# Patient Record
Sex: FEMALE | Race: WHITE | NOT HISPANIC OR LATINO | URBAN - METROPOLITAN AREA
[De-identification: names, ages, dates, MRNs, and addresses within clinical notes are randomized per-mention and may not be internally consistent; named-entity substitution may affect disease eponyms.]

---

## 2020-09-20 ENCOUNTER — EMERGENCY (EMERGENCY)
Facility: HOSPITAL | Age: 45
LOS: 0 days | Discharge: HOME | End: 2020-09-20
Attending: EMERGENCY MEDICINE | Admitting: EMERGENCY MEDICINE
Payer: COMMERCIAL

## 2020-09-20 VITALS
OXYGEN SATURATION: 98 % | HEART RATE: 73 BPM | DIASTOLIC BLOOD PRESSURE: 85 MMHG | WEIGHT: 175.05 LBS | SYSTOLIC BLOOD PRESSURE: 144 MMHG | RESPIRATION RATE: 18 BRPM | TEMPERATURE: 97 F

## 2020-09-20 DIAGNOSIS — R21 RASH AND OTHER NONSPECIFIC SKIN ERUPTION: ICD-10-CM

## 2020-09-20 DIAGNOSIS — H61.002 UNSPECIFIED PERICHONDRITIS OF LEFT EXTERNAL EAR: ICD-10-CM

## 2020-09-20 DIAGNOSIS — H92.09 OTALGIA, UNSPECIFIED EAR: ICD-10-CM

## 2020-09-20 DIAGNOSIS — H92.02 OTALGIA, LEFT EAR: ICD-10-CM

## 2020-09-20 PROCEDURE — 99283 EMERGENCY DEPT VISIT LOW MDM: CPT

## 2020-09-20 RX ORDER — CIPROFLOXACIN LACTATE 400MG/40ML
1 VIAL (ML) INTRAVENOUS
Qty: 20 | Refills: 0
Start: 2020-09-20 | End: 2020-09-29

## 2020-09-20 NOTE — ED PROVIDER NOTE - CARE PROVIDER_API CALL
Dulce Kebede  OTOLARYNGOLOGY  [address] [place] [address], [address] [place] [address]  Phone: [phone]  Fax: [phone]  Follow Up Time:

## 2020-09-20 NOTE — ED PROVIDER NOTE - CLINICAL SUMMARY MEDICAL DECISION MAKING FREE TEXT BOX
46yo F with left ear pain. Exam c/w perichondritis. Small pustules in region around left ear likely related to this infection. No fever, no mastoid tenderness, not septic. Patient taking clinda though not as prescribed. Advised to continue clinda as 600mg bid and in addition will add cipro. Recommended close ENT follow up this week. Strict return precautions given.

## 2020-09-20 NOTE — ED PROVIDER NOTE - NS ED ROS FT
Constitutional: No fever, chills.  Eyes:  No visual changes  ENMT:  +left ear pain  Cardiac:  No chest pain  Respiratory:  No cough, SOB  GI:  No nausea, vomiting, diarrhea, abdominal pain.  Neuro:  No headache or lightheadedness  Skin: +rash  Endocrine: No history of thyroid disease or diabetes.  Except as documented in the HPI,  all other systems are negative.

## 2020-09-20 NOTE — ED PROVIDER NOTE - OBJECTIVE STATEMENT
44yo F with no sig PMHx presents for left ear pain for 5 days. 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 Later that day felt like she had a rash on skin anterior to ear and later spread to pinna. Pt initially thought it was related to makeup and used benadryl cream. Skin became more red, painful. Also noticed a painful swollen gland on left neck. 4 days ago went to [facility], she was started on clinda 600mg tid but has accidentally been taking it bid instead. Yesterday noticed rash spreading along mandible and occipital scalp. No drainage. Denies fever, hearing changes, eye pain or redness, neck pain or stiffness, headache, sore throat.

## 2020-09-20 NOTE — ED PROVIDER NOTE - PATIENT PORTAL LINK FT
You can access the FollowMyHealth Patient Portal offered by [facility] by registering at the following website: [email or web address]. By joining [facility]’s FollowMyHealth portal, you will also be able to view your health information using other applications (apps) compatible with our system.

## 2020-09-20 NOTE — ED ADULT NURSE NOTE - NSIMPLEMENTINTERV_GEN_ALL_ED
63
Implemented All Universal Safety Interventions:  [place] to call system. Call bell, personal items and telephone within reach. Instruct patient to call for assistance. Room bathroom lighting operational. Non-slip footwear when patient is off stretcher. Physically safe environment: no spills, clutter or unnecessary equipment. Stretcher in lowest position, wheels locked, appropriate side rails in place.

## 2020-09-20 NOTE — ED PROVIDER NOTE - PHYSICAL EXAMINATION
Vital signs reviewed  GENERAL: Patient nontoxic appearing, NAD  EYES: Conjunctival clear  ENT: Left ear erythema and mild swelling involving approx 70% of the pinna, sparing superior portion, skin appears dry/ slightly crusting, no fluctuance or drainage, no erythema of external ear canal. TMs normal. No mastoid tenderness.   NECK: Supple. 1.5cm tender cervical LN on left neck.   RESPIRATORY: Normal respiratory effort. No stridor  CARDIOVASCULAR: Regular rate and rhythm  SKIN:  Collection of three 0.5mm pustules on left angle of mandible, and 2 smaller pustules posterior to left ear towards occipital scalp, with mild surrounding erythema, no fluctuance, no induration

## 2020-09-20 NOTE — ED PROVIDER NOTE - NSFOLLOWUPINSTRUCTIONS_ED_ALL_ED_FT
Follow up in 2-3 days with ENT.     Perichondritis, Adult       Perichondritis is an infection of the outer ear (pinna) that is caused by bacteria. The pinna is shaped to direct sound into the inner ear. It gets its shape from a firm, elastic tissue (cartilage). The cartilage is protected by another tissue called the perichondrium, which is just under the skin of the pinna.    If you injure your ear, bacteria can get under the skin and infect the perichondrium. The infection can cause swelling and pain. A collection of pus (abscess) may form between the perichondrium and the cartilage. This separates the perichondrium and its blood supply from the cartilage of the ear. A severe or untreated infection can lead to a type of ear deformity called cauliflower ear.      What are the causes?  This condition is caused by bacteria that enters the perichondrium when the outer ear is injured. The injury often results from an upper ear piercing that goes through cartilage instead of the ear lobe. Other causes include:  •Injury to the outer ear (trauma).      •Using needles in or on the ear to treat problems (acupuncture).      •Insect bite.      •Ear pimple or boil.      •Ear surgery.      •Ear burn.        What increases the risk?  You are more likely to develop this condition if:  •You have diabetes.      •You have a weak body defense system (immune system).        What are the signs or symptoms?  Symptoms of this condition include:  •Dull pain that gets worse.      •Increasing ear swelling.      •Warm, tender, red skin over the ear.      •Fever.      •Swollen lymph nodes near the ear.      •Pus draining from the ear.        How is this diagnosed?  This condition may be diagnosed based on:  •Your signs and symptoms, especially if you had a recent ear injury.       •A physical exam. The health care provider will check the ear for swelling, redness, or drainage.      •Lab tests. These may include testing a sample of pus to identify the type of bacteria that is causing the infection (culture).        How is this treated?    This condition is usually treated with antibiotic medicine. You will be given antibiotics that work well against most bacteria that cause the condition. If your infection is severe, you may be given antibiotics through an IV in the hospital. Your antibiotics will be changed if culture results show that your symptoms are caused by a specific kind of bacteria.  Other treatments for this condition may include:  •Using warm or cold compresses to help with pain and discomfort.      •Taking pain medicine.      •Removing pus and dead cartilage with a surgical knife (incision and drainage) or a needle (aspiration).      •Injecting antibiotics and anti-inflammatory medicine (corticosteroids) into the affected area.        Follow these instructions at home:    Medicines     •Take over-the-counter and prescription medicines only as told by your health care provider.       •Take your antibiotic medicine as told by your health care provider. Do not stop taking the antibiotic even if you start to feel better.      Managing pain and swelling   •If directed, put ice on the affected area.  •Put ice in a plastic bag.      •Place a towel between your skin and the bag.      •Leave the ice on for 20 minutes, 2–3 times a day.      •If directed, apply heat to the affected area. Use the heat source that your health care provider recommends, such as a moist heat pack or a heating pad.  •Place a towel between your skin and the heat source.       •Leave the heat on for 20–30 minutes.       •Remove the heat if your skin turns bright red. This is especially important if you are unable to feel pain, heat, or cold. You may have a greater risk of getting burned.        General instructions   •If you had incision and drainage done, follow instructions from your health care provider about how to take care of your incision. Make sure you:   •Wash your hands with soap and water before and after you change your bandage (dressing). If soap and water are not available, use hand .       •Change your dressing as told by your health care provider.       •Check your incision area every day for more redness, swelling, pain, warmth, pus, or a bad smell.      •Do not take baths, swim, or use a hot tub until your health care provider approves.         •Clean the ear as told by your health care provider.      •Resume your usual activities as told by your health care provider. Ask your health care provider what activities are safe for you.       •Keep all follow-up visits as told by your health care provider. This is important.        Contact a health care provider if you have:    •New or increasing chills or fever.      •Any signs of infection that get worse or do not get better. These may include more redness or drainage.      •A severe headache.        Get help right away if you:    •Develop a sudden increase in symptoms such as pain, fever, and swelling.        Summary    •Perichondritis is a bacterial infection of your outer ear. If you injure your ear, bacteria can get under the skin and infect the perichondrium.      •The main symptoms of this condition are pain, swelling, and tender, red skin over the ear.      •This condition can be diagnosed by your signs and symptoms, especially after an ear injury.      •Perichondritis is usually treated with antibiotic medicine. Other treatments include removing pus and dead cartilage with a surgical knife (incision and drainage) or a needle (aspiration).      •Follow instructions about taking medicines, managing pain and swelling, and taking care of your incision if you had incision and drainage done.      This information is not intended to replace advice given to you by your health care provider. Make sure you discuss any questions you have with your health care provider.

## 2020-09-21 PROBLEM — Z00.00 ENCOUNTER FOR PREVENTIVE HEALTH EXAMINATION: Status: ACTIVE | Noted: 2020-09-21

## 2020-09-22 ENCOUNTER — APPOINTMENT (OUTPATIENT)
Dept: OTOLARYNGOLOGY | Facility: CLINIC | Age: 45
End: 2020-09-22
Payer: COMMERCIAL

## 2020-09-22 PROCEDURE — 99205 OFFICE O/P NEW HI 60 MIN: CPT

## 2020-09-22 RX ORDER — PREDNISONE 10 MG/1
10 TABLET ORAL
Qty: 50 | Refills: 0 | Status: ACTIVE | COMMUNITY
Start: 2020-09-22 | End: 1900-01-01

## 2020-09-22 NOTE — PHYSICAL EXAM
[de-identified] : left angie-auricular vesicles [Midline] : trachea located in midline position [Normal] : no rashes

## 2020-09-22 NOTE — HISTORY OF PRESENT ILLNESS
[de-identified] : 45 y.o. female seen in ED on 09/20/20 and diagnosed with a left ear infection. she was given clindamycin initially. her swelling got worse 3 days later. She then came to [facility]. she was given cipro in addition to her clindamycin. \par The next day her PCP thought it was shingles. \par \par pain and crusting is getting better. No change in hearing. No facial paralysis.

## 2020-09-22 NOTE — ASSESSMENT
[FreeTextEntry1] : Patient to start Valtrex given by her PCP. \par Patient advised to start steroids immediately if her face becomes paralyzed. \par \par RTC in 4days.

## 2020-09-28 ENCOUNTER — APPOINTMENT (OUTPATIENT)
Dept: OTOLARYNGOLOGY | Facility: CLINIC | Age: 45
End: 2020-09-28
Payer: COMMERCIAL

## 2020-09-28 PROCEDURE — 99213 OFFICE O/P EST LOW 20 MIN: CPT

## 2020-09-28 RX ORDER — CLINDAMYCIN HYDROCHLORIDE 300 MG/1
300 CAPSULE ORAL
Qty: 21 | Refills: 0 | Status: ACTIVE | COMMUNITY
Start: 2020-06-04

## 2020-09-28 RX ORDER — AZELASTINE HYDROCHLORIDE 0.5 MG/ML
0.05 SOLUTION/ DROPS OPHTHALMIC
Qty: 6 | Refills: 0 | Status: ACTIVE | COMMUNITY
Start: 2020-08-19

## 2020-09-28 RX ORDER — HYDROCORTISONE 25 MG/G
2.5 CREAM TOPICAL
Qty: 28 | Refills: 0 | Status: ACTIVE | COMMUNITY
Start: 2020-05-28

## 2020-09-28 RX ORDER — CETIRIZINE HYDROCHLORIDE 10 MG/1
10 TABLET, COATED ORAL
Qty: 30 | Refills: 0 | Status: ACTIVE | COMMUNITY
Start: 2020-08-19

## 2020-09-28 RX ORDER — DESLORATADINE 5 MG/1
5 TABLET, ORALLY DISINTEGRATING ORAL
Qty: 90 | Refills: 0 | Status: ACTIVE | COMMUNITY
Start: 2020-07-27

## 2020-09-28 RX ORDER — CIPROFLOXACIN HYDROCHLORIDE 500 MG/1
500 TABLET, FILM COATED ORAL
Qty: 20 | Refills: 0 | Status: ACTIVE | COMMUNITY
Start: 2020-09-20

## 2020-09-28 RX ORDER — BUSPIRONE HYDROCHLORIDE 10 MG/1
10 TABLET ORAL
Qty: 270 | Refills: 0 | Status: ACTIVE | COMMUNITY
Start: 2020-04-28

## 2020-09-28 NOTE — HISTORY OF PRESENT ILLNESS
[de-identified] : 45 y.o. female seen in ED on 09/20/20 and diagnosed with a left ear infection. she was given clindamycin initially. her swelling got worse 3 days later. She then came to [facility]. she was given cipro in addition to her clindamycin. \par The next day her PCP thought it was shingles. \par \par pain and crusting is getting better. No change in hearing. No facial paralysis.  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 Patient admits otalgia is better. No numbness. no more swelling.\par Her face is symmetric, did not need prednisone.

## 2020-09-28 NOTE — PHYSICAL EXAM
[Midline] : trachea located in midline position [Normal] : no rashes [de-identified] : left preauricular rash, improving, no IAC rash

## 2020-10-05 ENCOUNTER — APPOINTMENT (OUTPATIENT)
Dept: OTOLARYNGOLOGY | Facility: CLINIC | Age: 45
End: 2020-10-05
Payer: COMMERCIAL

## 2020-10-05 DIAGNOSIS — B02.9 ZOSTER W/OUT COMPLICATIONS: ICD-10-CM

## 2020-10-05 DIAGNOSIS — M94.8X9 OTHER SPECIFIED DISORDERS OF CARTILAGE, UNSPECIFIED SITES: ICD-10-CM

## 2020-10-05 DIAGNOSIS — H92.02 OTALGIA, LEFT EAR: ICD-10-CM

## 2020-10-05 PROCEDURE — 99214 OFFICE O/P EST MOD 30 MIN: CPT

## 2020-10-05 NOTE — REASON FOR VISIT
[Subsequent Evaluation] : a subsequent evaluation for [FreeTextEntry2] : perichondritis, shingles rash, left otalgia

## 2020-10-05 NOTE — PHYSICAL EXAM
[Midline] : trachea located in midline position [Normal] : no rashes [de-identified] :  improving, no IAC rash [de-identified] : No facial asymmetry

## 2020-10-05 NOTE — HISTORY OF PRESENT ILLNESS
[de-identified] : 45 y.o. female seen in ED on 09/20/20 and diagnosed with a left ear infection. she was given clindamycin initially. her swelling got worse 3 days later. She then came to [facility]. she was given cipro in addition to her clindamycin. \par The next day her PCP thought it was shingles. \par \par pain and crusting is getting better. No change in hearing. No facial paralysis. 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 Patient admits otalgia is better. No numbness. no more swelling.\par Her face is symmetric, did not need prednisone.  [FreeTextEntry1] : \par 10/05/2020: Patient following up on perichondritis, shingles rash and left otalgia. She admits minimal pain on the exterior part of ear. small angie-auricalar ulcer still healing. She is still on Valtrex. Ear not swollen anymore.

## 2020-10-05 NOTE — ASSESSMENT
[FreeTextEntry1] : finish valtrex. \par \par Discussed taking high dose of steroids in case face gets paralyzed.\par \par Continue bacitracin. \par \par RTC in 1M

## 2020-11-04 ENCOUNTER — APPOINTMENT (OUTPATIENT)
Dept: OTOLARYNGOLOGY | Facility: CLINIC | Age: 45
End: 2020-11-04

## 2023-01-23 ENCOUNTER — APPOINTMENT (OUTPATIENT)
Dept: OTOLARYNGOLOGY | Facility: CLINIC | Age: 48
End: 2023-01-23

## 2024-08-28 ENCOUNTER — APPOINTMENT (OUTPATIENT)
Dept: OBGYN | Facility: CLINIC | Age: 49
End: 2024-08-28

## 2024-08-28 VITALS
HEIGHT: 66 IN | WEIGHT: 169 LBS | SYSTOLIC BLOOD PRESSURE: 144 MMHG | DIASTOLIC BLOOD PRESSURE: 82 MMHG | HEART RATE: 66 BPM | OXYGEN SATURATION: 95 % | BODY MASS INDEX: 27.16 KG/M2

## 2024-08-28 DIAGNOSIS — L29.2 PRURITUS VULVAE: ICD-10-CM

## 2024-08-28 PROCEDURE — 99203 OFFICE O/P NEW LOW 30 MIN: CPT

## 2024-09-07 NOTE — HISTORY OF PRESENT ILLNESS
[postmenopausal] : postmenopausal [Y] : Positive pregnancy history [Menarche Age: ____] : age at menarche was [unfilled] [Currently In Menopause] : currently in menopause [Post-Menopause, No Sxs] : post-menopausal, currently without symptoms [Menopause Age: ____] : age at menopause was [unfilled] [Yes] : Patient has concerns regarding sex [Currently Active] : currently active [Men] : men [No] : No [Mammogramdate] : 7/2024 [PapSmeardate] : 2024 [ColonoscopyDate] : 6/2023 [PGxTotal] : 1 [[facility] : 2 [FreeTextEntry1] : Pain during intercourse.

## 2024-09-07 NOTE — HISTORY OF PRESENT ILLNESS
[postmenopausal] : postmenopausal [Y] : Positive pregnancy history [Menarche Age: ____] : age at menarche was [unfilled] [Currently In Menopause] : currently in menopause [Post-Menopause, No Sxs] : post-menopausal, currently without symptoms [Menopause Age: ____] : age at menopause was [unfilled] [Yes] : Patient has concerns regarding sex [Currently Active] : currently active [Men] : men [No] : No [Mammogramdate] : 7/2024 [PapSmeardate] : 2024 [ColonoscopyDate] : 6/2023 [PGxTotal] : 1 [[facility] : 2 [FreeTextEntry1] : Pain during intercourse.

## 2024-09-07 NOTE — PHYSICAL EXAM
[Chaperone Present] : A chaperone was present in the examining room during all aspects of the physical examination [Appropriately responsive] : appropriately responsive [Alert] : alert [No Acute Distress] : no acute distress [Oriented x3] : oriented x3 [Normal] : normal [FreeTextEntry2] : [place] [FreeTextEntry1] : atrophy with white patches on vulva, specifically periclitorally

## 2024-09-07 NOTE — PLAN
[FreeTextEntry1] : Leann presents for second opinion about vulvar itching, with biopsy results that appear consistent with inflammation but not with lichen sclerosis.   #Vulvar itching - I advised that patient email our office records as soon as she is able so that I can closely review records in order to fully weigh in on clinical condition and advised treatment modalities. Without this close review, I am unable to completely weigh in on advised course of action - patient understands this and will send records to our office. - That being said, I did express that I do concur that generally speaking, with a biopsy pathology demonstrating inflammation, that a topical steroid would generally mechanistically be the correct choice for treatment. - I strongly advised patient continue to follow with her continuity GYN provider, who has worked hard to coordinate MDMs related to her care, in order to follow forth with the plan as stated - All questions answered.  RTC PRN, requested records to be sent to us so I can review.

## 2025-01-10 ENCOUNTER — APPOINTMENT (OUTPATIENT)
Dept: OBGYN | Facility: CLINIC | Age: 50
End: 2025-01-10